# Patient Record
Sex: FEMALE | Race: OTHER | Employment: UNEMPLOYED | URBAN - METROPOLITAN AREA
[De-identification: names, ages, dates, MRNs, and addresses within clinical notes are randomized per-mention and may not be internally consistent; named-entity substitution may affect disease eponyms.]

---

## 2019-04-08 ENCOUNTER — HOSPITAL ENCOUNTER (OUTPATIENT)
Dept: LAB | Age: 29
Discharge: HOME | End: 2019-04-08

## 2019-04-08 PROCEDURE — 87491 CHLMYD TRACH DNA AMP PROBE: CPT

## 2019-04-08 PROCEDURE — 87086 URINE CULTURE/COLONY COUNT: CPT

## 2019-04-10 LAB
BACTERIA SPEC CULT: ABNORMAL
BACTERIA SPEC CULT: ABNORMAL
CC UR VC: ABNORMAL
SERVICE CMNT-IMP: ABNORMAL

## 2021-12-22 ENCOUNTER — TRANSCRIBE ORDER (OUTPATIENT)
Dept: SCHEDULING | Age: 31
End: 2021-12-22

## 2021-12-22 DIAGNOSIS — R16.0 LIVER MASS: Primary | ICD-10-CM

## 2022-01-11 ENCOUNTER — HOSPITAL ENCOUNTER (OUTPATIENT)
Dept: MRI IMAGING | Age: 32
Discharge: HOME | End: 2022-01-11
Attending: PHYSICIAN ASSISTANT
Payer: SUBSIDIZED

## 2022-01-11 DIAGNOSIS — R16.0 LIVER MASS: ICD-10-CM

## 2022-01-11 PROCEDURE — 74181 MRI ABDOMEN W/O CONTRAST: CPT

## 2022-09-08 ENCOUNTER — HOSPITAL ENCOUNTER (OUTPATIENT)
Dept: LAB | Age: 32
Discharge: HOME | End: 2022-09-08

## 2022-09-08 PROCEDURE — 87798 DETECT AGENT NOS DNA AMP: CPT

## 2022-09-08 PROCEDURE — 87086 URINE CULTURE/COLONY COUNT: CPT

## 2022-09-10 LAB
BACTERIA SPEC CULT: NORMAL
SERVICE CMNT-IMP: NORMAL

## 2022-09-13 LAB
A VAGINAE DNA VAG QL NAA+PROBE: ABNORMAL SCORE
BVAB2 DNA VAG QL NAA+PROBE: ABNORMAL SCORE
C ALBICANS DNA VAG QL NAA+PROBE: NEGATIVE
C GLABRATA DNA VAG QL NAA+PROBE: NEGATIVE
MEGA1 DNA VAG QL NAA+PROBE: ABNORMAL SCORE
SPECIMEN SOURCE: ABNORMAL
T VAGINALIS RRNA SPEC QL NAA+PROBE: NEGATIVE

## 2023-08-22 LAB
ABO, EXTERNAL RESULT: NORMAL
C. TRACHOMATIS, EXTERNAL RESULT: NEGATIVE
HEP B, EXTERNAL RESULT: NEGATIVE
HIV, EXTERNAL RESULT: NEGATIVE
N. GONORRHOEAE, EXTERNAL RESULT: NEGATIVE
RH FACTOR, EXTERNAL RESULT: POSITIVE
RPR, EXTERNAL RESULT: NORMAL
RUBELLA TITER, EXTERNAL RESULT: NORMAL

## 2023-12-12 ENCOUNTER — ROUTINE PRENATAL (OUTPATIENT)
Age: 33
End: 2023-12-12

## 2023-12-12 VITALS — DIASTOLIC BLOOD PRESSURE: 73 MMHG | HEIGHT: 64 IN | HEART RATE: 93 BPM | SYSTOLIC BLOOD PRESSURE: 117 MMHG

## 2023-12-12 DIAGNOSIS — E66.01 MORBID (SEVERE) OBESITY DUE TO EXCESS CALORIES (HCC): ICD-10-CM

## 2023-12-12 DIAGNOSIS — Z3A.31 31 WEEKS GESTATION OF PREGNANCY: Primary | ICD-10-CM

## 2023-12-12 PROCEDURE — 76811 OB US DETAILED SNGL FETUS: CPT | Performed by: OBSTETRICS & GYNECOLOGY

## 2023-12-12 PROCEDURE — 99204 OFFICE O/P NEW MOD 45 MIN: CPT | Performed by: OBSTETRICS & GYNECOLOGY

## 2023-12-13 PROBLEM — E66.01 MORBID (SEVERE) OBESITY DUE TO EXCESS CALORIES (HCC): Status: ACTIVE | Noted: 2023-12-13

## 2023-12-13 NOTE — PROCEDURES
PATIENT: Antonio Leyva   -  : 1990   -  DOS:2023   -  INTERPRETING PROVIDER:Namrata Abdi,   Indication  ========    Obesity in pregnancy BMI 43    Method  ======    Transabdominal ultrasound examination. View: Suboptimal view: limited by maternal body habitus, limited by late gestational age    Dating  ======    LMP on: 2023  Cycle: regular cycle  GA by LMP 35 w + 2 d  LAYNE by LMP: 2024  Previous Ultrasound on: 2023  Type of prior assessment: GA  GA at prior assessment date 12 w + 1 d  GA by previous U/S 31 w + 0 d  LAYNE by previous Ultrasound: 2024  Ultrasound examination on: 2023  GA by U/S based upon: AC, BPD, Femur, HC  GA by U/S 28 w + 4 d  LAYNE by U/S: 2024  Assigned: based on ultrasound (GA), selected on 2023  Assigned GA 31 w + 0 d  Assigned LAYNE: 2024    Fetal Growth Overview  =================    Exam date        GA              BPD (mm)          HC (mm)              AC (mm)               FL (mm)             HL (mm)          EFW (g)  2023        31w 0d        82.8     94%        293.7    53%        295.3     97%        58.8    27%        56     89%            86%    Fetal Biometry  ============    Standard  BPD 82.8 mm 33w 2d 94% Hadlock  .0 mm 33w 4d 95% Louisa  .7 mm 32w 3d 53% Hadlock  Cerebellum tr 42.0 mm 33w 1d 93% Hill  .3 mm 33w 4d 97% Hadlock  Femur 58.8 mm 30w 5d 27% Hadlock  Humerus 56.0 mm 32w 4d 89% Louisa  EFW 2,003 g 32w 2d 86% Hadlock  EFW (lb) 4 lb  EFW (oz) 7 oz  EFW by: Hadlock (BPD-HC-AC-FL)  Extended   5.5 mm  CM 5.5 mm  10% Nicolaides  Head / Face / Neck  Nasal bone: NOT VISUALIZED  Other Structures   bpm    General Evaluation  ==============    Cardiac activity present.  bpm. Fetal movements: visualized. Presentation: Cephalic  Placenta: Placental site: anterior, appropriate distance from the internal os  Umbilical cord: Cord vessels: 3 vessel cord.  Insertion site: placental insertion

## 2024-01-09 ENCOUNTER — ROUTINE PRENATAL (OUTPATIENT)
Age: 34
End: 2024-01-09
Payer: MEDICAID

## 2024-01-09 VITALS — HEART RATE: 99 BPM | SYSTOLIC BLOOD PRESSURE: 118 MMHG | DIASTOLIC BLOOD PRESSURE: 75 MMHG

## 2024-01-09 DIAGNOSIS — O99.213 MATERNAL MORBID OBESITY IN THIRD TRIMESTER, ANTEPARTUM (HCC): Primary | ICD-10-CM

## 2024-01-09 DIAGNOSIS — Z3A.35 35 WEEKS GESTATION OF PREGNANCY: ICD-10-CM

## 2024-01-09 DIAGNOSIS — O36.63X0 LGA (LARGE FOR GESTATIONAL AGE) FETUS AFFECTING MANAGEMENT OF MOTHER, THIRD TRIMESTER, NOT APPLICABLE OR UNSPECIFIED FETUS: ICD-10-CM

## 2024-01-09 DIAGNOSIS — E66.01 MATERNAL MORBID OBESITY IN THIRD TRIMESTER, ANTEPARTUM (HCC): Primary | ICD-10-CM

## 2024-01-09 PROCEDURE — 76816 OB US FOLLOW-UP PER FETUS: CPT | Performed by: OBSTETRICS & GYNECOLOGY

## 2024-01-09 PROCEDURE — 76819 FETAL BIOPHYS PROFIL W/O NST: CPT | Performed by: OBSTETRICS & GYNECOLOGY

## 2024-01-09 PROCEDURE — 99212 OFFICE O/P EST SF 10 MIN: CPT | Performed by: OBSTETRICS & GYNECOLOGY

## 2024-01-09 NOTE — PROCEDURES
allowing us to share in this patient?s care. Please do not hesitate to call if you have any questions.    Plan of Care  ==========    CC: Obesity in pregnancy BMI 43    Patient returns today for an ultrasound examination and office visit/consultation. Patient?s interval obstetrical history is reported as benign and uneventful. Patient denies  any obstetrical complaints. Patient reports good fetal movements. Patient denies regular/frequent contractions, vaginal bleeding or leakage of fluid.    Impression:  Intrauterine pregnancy at 35 weeks and 0 days.  Morbid obesity.  Grand multiparity. Patient has delivered Vaginally a baby whose  weight was greater than 9 pounds.    Recommendations:  Return to your office for obstetrical care and delivery.  See my recommendations below.  I recommend the patient be delivered at 39 weeks and 0 to 6 days gestation. An earlier delivery might be necessary depending on the clinical circumstances or the  emergence of other complications.    Patient expressed her understanding of the above. I answered all her questions.  Thank you for allowing us to share in this patient's care. If you have any questions or if I can be of any further assistance to you, please do not hesitate to call.  For this office visit/consultation I spent 15 minutes in the care of this patient.      Parts of this note and ultrasound report were completed using Dragon medical speech recognition software. Grammatical errors, random word insertions, pronoun errors  and incomplete sentences are occasional consequences of voice recognition technology and other factors. If there are any questions or concerns about the content of this  note or information contained in this dictation, please let me know as soon as possible.      Throughout this encounter the patient and FOB were wearing a mask, and I was wearing a mask. I maintained the recommended social distancing.  Due to the increased risk of Covid-19 and other

## 2024-01-23 LAB — GBS, EXTERNAL RESULT: NEGATIVE

## 2024-01-25 ENCOUNTER — ROUTINE PRENATAL (OUTPATIENT)
Age: 34
End: 2024-01-25
Payer: MEDICAID

## 2024-01-25 VITALS — SYSTOLIC BLOOD PRESSURE: 112 MMHG | DIASTOLIC BLOOD PRESSURE: 74 MMHG | HEART RATE: 99 BPM

## 2024-01-25 DIAGNOSIS — Z3A.37 37 WEEKS GESTATION OF PREGNANCY: ICD-10-CM

## 2024-01-25 DIAGNOSIS — O09.43 GRAND MULTIPARITY WITH ANTENATAL PROBLEM IN THIRD TRIMESTER: ICD-10-CM

## 2024-01-25 DIAGNOSIS — O99.213 MATERNAL MORBID OBESITY IN THIRD TRIMESTER, ANTEPARTUM (HCC): Primary | ICD-10-CM

## 2024-01-25 DIAGNOSIS — E66.01 MATERNAL MORBID OBESITY IN THIRD TRIMESTER, ANTEPARTUM (HCC): Primary | ICD-10-CM

## 2024-01-25 DIAGNOSIS — O36.63X0 LGA (LARGE FOR GESTATIONAL AGE) FETUS AFFECTING MANAGEMENT OF MOTHER, THIRD TRIMESTER, NOT APPLICABLE OR UNSPECIFIED FETUS: ICD-10-CM

## 2024-01-25 PROCEDURE — 99212 OFFICE O/P EST SF 10 MIN: CPT | Performed by: OBSTETRICS & GYNECOLOGY

## 2024-01-25 PROCEDURE — 76819 FETAL BIOPHYS PROFIL W/O NST: CPT | Performed by: OBSTETRICS & GYNECOLOGY

## 2024-01-25 NOTE — PROCEDURES
normal    Findings  =======    Viable Islas pregnancy at 37w 2d by clinical dates.  Amniotic fluid is normal.  Placenta is anterior, appropriate distance from the internal os.  Biophysical profile score is 8/8.  Cephalic presentation.    Islas living intrauterine pregnancy at 37 weeks and 2 days.  Limited fetal anatomic survey.  Reassuring ultrasound fetal biophysical profile score. Although these studies are reassuring, they do not guarantee a normal fetal outcome.  The patient expressed understanding of the findings as described above as well as the diagnostic limitations of obstetric ultrasound examinations. I answered all her  questions.    Thank you for allowing us to share in this patient?s care. Please do not hesitate to call if you have any questions.    Plan of Care  ==========    CC: Obesity in pregnancy BMI 43, LGA. Grand multiparity.    This consultation was carried out with the help of a Czech-speaking  #[id number] who translated for the patient.    Patient returns today for an ultrasound examination and office visit/consultation. Patient?s interval obstetrical history is reported as benign and uneventful. Patient denies  any obstetrical complaints. Patient reports good fetal movements. Patient denies regular/frequent contractions, vaginal bleeding or leakage of fluid.    Patient complained of feeling tired. She occasionally has a bifrontal headache that resolves on its own. From time to time, she experiences some mild shortness of breath.  She denies chest pain.    Patient stated that she has been scheduled to be delivered on or around 2024.    Impression:  Intrauterine pregnancy at 35 weeks and 0 days.  Morbid obesity.  Grand multiparity. Patient has delivered Vaginally a baby whose  weight was greater than 9 pounds. She is at increased risks for peripartum complications such as  malpresentation, postpartum hemorrhage, among others.    Recommendations:  Return to

## 2024-02-07 ENCOUNTER — HOSPITAL ENCOUNTER (INPATIENT)
Facility: HOSPITAL | Age: 34
LOS: 1 days | Discharge: HOME | DRG: 560 | End: 2024-02-09
Attending: STUDENT IN AN ORGANIZED HEALTH CARE EDUCATION/TRAINING PROGRAM | Admitting: OBSTETRICS & GYNECOLOGY
Payer: MEDICAID

## 2024-02-07 ENCOUNTER — HOSPITAL ENCOUNTER (OUTPATIENT)
Facility: HOSPITAL | Age: 34
Discharge: HOME | DRG: 560 | End: 2024-02-07
Attending: STUDENT IN AN ORGANIZED HEALTH CARE EDUCATION/TRAINING PROGRAM | Admitting: STUDENT IN AN ORGANIZED HEALTH CARE EDUCATION/TRAINING PROGRAM
Payer: MEDICAID

## 2024-02-07 VITALS
SYSTOLIC BLOOD PRESSURE: 115 MMHG | HEART RATE: 93 BPM | OXYGEN SATURATION: 98 % | TEMPERATURE: 98.3 F | DIASTOLIC BLOOD PRESSURE: 69 MMHG | RESPIRATION RATE: 16 BRPM

## 2024-02-07 PROCEDURE — 7210000100 HC LABOR FEE PER 1 HR: Performed by: OBSTETRICS & GYNECOLOGY

## 2024-02-07 PROCEDURE — G0378 HOSPITAL OBSERVATION PER HR: HCPCS

## 2024-02-07 PROCEDURE — 99212 OFFICE O/P EST SF 10 MIN: CPT

## 2024-02-07 PROCEDURE — 59025 FETAL NON-STRESS TEST: CPT

## 2024-02-07 PROCEDURE — G0379 DIRECT REFER HOSPITAL OBSERV: HCPCS

## 2024-02-07 NOTE — PROGRESS NOTES
1305: Patient arrived to L&D room 209 with reports of contractions every 10 min. Pt denies any leaking of fluid. Pt reports good fetal movement. Pt oriented to room and unit, plan of care discussed with pt, pt verbalized understanding.     1315: SVE per this RN 4/50/-3.    1320: Dr. Echeverria given an update on pt, MD stated to have a 20 min NST and let pt walk for an hour and recheck.    1445: Dr. Echeverria at bedside, SVE per MD 4/50/-3. MD stated pt can be discharged home. MD reviewing discharge instructions with pt, pt verbalized understanding.     1455: Patient given written and verbal discharge instructions, pt verbalized understanding and discharged home in stable condition.

## 2024-02-07 NOTE — H&P
History & Physical    Name: Kirsten Mercado MRN: [id number]  SSN: [id number]    YOB: 1990  Age: 33 y.o.  Sex: female        Subjective:     Estimated Date of Delivery: 24    Ms. Mercado is a  at 39w1d presenting to L&D for pelvic pain. Reports pressure in her low abdomen. Reports contractions started this morning. Currently feeling them every 15-20 mins. Pressure is present between contractions. Denies LOF, VB. +GFM. SVE in office 2 days ago was 4/50/-3     Pregnancy complicated by:  Suspected macrosomia at 33wks, ?resolved: EFW 63rd %tile at 37wks 3158gm  BMI 44- ldASA  Late transfer of care at 26wks      OB History          6    Para   5    Term   5       0    AB   0    Living   5         SAB   0    IAB   0    Ectopic   0    Molar   0    Multiple   0    Live Births   5              No past medical history on file.  No past surgical history on file.  Social History     Occupational History    Not on file   Tobacco Use    Smoking status: Never    Smokeless tobacco: Never   Vaping Use    Vaping Use: Never used   Substance and Sexual Activity    Alcohol use: Not Currently    Drug use: Never    Sexual activity: Not on file     Family History   Problem Relation Age of Onset    No Known Problems Father     No Known Problems Mother        No Known Allergies  Prior to Admission medications    Medication Sig Start Date End Date Taking? Authorizing Provider   Prenatal Vit w/Kz-Iutqpgiio-JG (PNV PO) Take by mouth    Provider, MD Twin        Review of Systems: Pertinent items are noted in HPI.    Objective:     Vitals:  Vitals:    24 1335 24 1340 24 1345 24 1350   BP:       Pulse:       Resp:       Temp:       TempSrc:       SpO2: 99% 99% 98% 98%        Physical Exam:  Gen: NAD    Membranes:  Intact  Fetal Heart Rate: Reactive  Baseline: 130 per minute  Variability: moderate  Accelerations: yes  Decelerations: none  Uterine contractions: none noted on TOCO    SVE:  4/50/-3, unchanged compared to prior exam    Prenatal Labs:   No components found for: \"OBEXTABORH\", \"OBEXTABSCRN\", \"OBEXTRUBELLA\", \"OBEXTGRBS\", \"OBEXTHBSAG\", \"OBEXTHIV\", \"OBEXTRPR\", \"OBEXTGONORR\", \"OBEXTCHLAM\"     Assessment/Plan:     Plan: 33 y.o.  at 39w1d, presenting for labor rule out  - Rh pos / RI/GBS NEG   - Reactive and reassuring NST  - SVE unchanged over 2 hrs. Pt reports contraction every 15-20 mins. No contractions noted on TOCO. We discussed she may be in early or false labor but there are no current signs of active labor. Reviewed STRICT labor precautions, advised to return to hospital immediately if contractions increase in frequency or intensity  -Scheduled for IOL on     Signed By:  Rosey Echeverria MD     2024

## 2024-02-08 ENCOUNTER — ANESTHESIA EVENT (OUTPATIENT)
Facility: HOSPITAL | Age: 34
End: 2024-02-08
Payer: MEDICAID

## 2024-02-08 ENCOUNTER — ANESTHESIA (OUTPATIENT)
Facility: HOSPITAL | Age: 34
End: 2024-02-08
Payer: MEDICAID

## 2024-02-08 PROBLEM — E66.01 MORBID OBESITY (HCC): Status: ACTIVE | Noted: 2024-02-08

## 2024-02-08 PROBLEM — Z3A.39 39 WEEKS GESTATION OF PREGNANCY: Status: ACTIVE | Noted: 2024-02-08

## 2024-02-08 PROBLEM — E66.01 MORBID (SEVERE) OBESITY DUE TO EXCESS CALORIES (HCC): Status: RESOLVED | Noted: 2023-12-13 | Resolved: 2024-02-08

## 2024-02-08 LAB
ABO + RH BLD: NORMAL
BASOPHILS # BLD: 0.1 K/UL (ref 0–0.1)
BASOPHILS NFR BLD: 1 % (ref 0–1)
BLOOD GROUP ANTIBODIES SERPL: NORMAL
DIFFERENTIAL METHOD BLD: ABNORMAL
EOSINOPHIL # BLD: 0.1 K/UL (ref 0–0.4)
EOSINOPHIL NFR BLD: 1 % (ref 0–7)
ERYTHROCYTE [DISTWIDTH] IN BLOOD BY AUTOMATED COUNT: 14.9 % (ref 11.5–14.5)
HCT VFR BLD AUTO: 32 % (ref 35–47)
HGB BLD-MCNC: 10.7 G/DL (ref 11.5–16)
IMM GRANULOCYTES # BLD AUTO: 0.1 K/UL (ref 0–0.04)
IMM GRANULOCYTES NFR BLD AUTO: 1 % (ref 0–0.5)
LYMPHOCYTES # BLD: 2 K/UL (ref 0.8–3.5)
LYMPHOCYTES NFR BLD: 27 % (ref 12–49)
MCH RBC QN AUTO: 27.2 PG (ref 26–34)
MCHC RBC AUTO-ENTMCNC: 33.4 G/DL (ref 30–36.5)
MCV RBC AUTO: 81.4 FL (ref 80–99)
MONOCYTES # BLD: 0.5 K/UL (ref 0–1)
MONOCYTES NFR BLD: 7 % (ref 5–13)
NEUTS SEG # BLD: 4.7 K/UL (ref 1.8–8)
NEUTS SEG NFR BLD: 63 % (ref 32–75)
NRBC # BLD: 0 K/UL (ref 0–0.01)
NRBC BLD-RTO: 0 PER 100 WBC
PLATELET # BLD AUTO: 375 K/UL (ref 150–400)
PMV BLD AUTO: 10.3 FL (ref 8.9–12.9)
RBC # BLD AUTO: 3.93 M/UL (ref 3.8–5.2)
SPECIMEN EXP DATE BLD: NORMAL
WBC # BLD AUTO: 7.4 K/UL (ref 3.6–11)

## 2024-02-08 PROCEDURE — 7220000101 HC DELIVERY VAGINAL/SINGLE: Performed by: OBSTETRICS & GYNECOLOGY

## 2024-02-08 PROCEDURE — 2500000003 HC RX 250 WO HCPCS: Performed by: NURSE ANESTHETIST, CERTIFIED REGISTERED

## 2024-02-08 PROCEDURE — 1120000000 HC RM PRIVATE OB

## 2024-02-08 PROCEDURE — 3700000156 HC EPIDURAL ANESTHESIA: Performed by: NURSE ANESTHETIST, CERTIFIED REGISTERED

## 2024-02-08 PROCEDURE — 6370000000 HC RX 637 (ALT 250 FOR IP): Performed by: OBSTETRICS & GYNECOLOGY

## 2024-02-08 PROCEDURE — 6360000002 HC RX W HCPCS: Performed by: NURSE ANESTHETIST, CERTIFIED REGISTERED

## 2024-02-08 PROCEDURE — 3700000025 EPIDURAL BLOCK: Performed by: ANESTHESIOLOGY

## 2024-02-08 PROCEDURE — 86900 BLOOD TYPING SEROLOGIC ABO: CPT

## 2024-02-08 PROCEDURE — 00HU33Z INSERTION OF INFUSION DEVICE INTO SPINAL CANAL, PERCUTANEOUS APPROACH: ICD-10-PCS | Performed by: ANESTHESIOLOGY

## 2024-02-08 PROCEDURE — 86850 RBC ANTIBODY SCREEN: CPT

## 2024-02-08 PROCEDURE — 51701 INSERT BLADDER CATHETER: CPT

## 2024-02-08 PROCEDURE — 86901 BLOOD TYPING SEROLOGIC RH(D): CPT

## 2024-02-08 PROCEDURE — 2580000003 HC RX 258: Performed by: OBSTETRICS & GYNECOLOGY

## 2024-02-08 PROCEDURE — 7210000100 HC LABOR FEE PER 1 HR: Performed by: OBSTETRICS & GYNECOLOGY

## 2024-02-08 PROCEDURE — 36415 COLL VENOUS BLD VENIPUNCTURE: CPT

## 2024-02-08 PROCEDURE — 85025 COMPLETE CBC W/AUTO DIFF WBC: CPT

## 2024-02-08 PROCEDURE — 6360000002 HC RX W HCPCS: Performed by: OBSTETRICS & GYNECOLOGY

## 2024-02-08 PROCEDURE — 94761 N-INVAS EAR/PLS OXIMETRY MLT: CPT

## 2024-02-08 RX ORDER — SODIUM CHLORIDE, SODIUM LACTATE, POTASSIUM CHLORIDE, AND CALCIUM CHLORIDE .6; .31; .03; .02 G/100ML; G/100ML; G/100ML; G/100ML
1000 INJECTION, SOLUTION INTRAVENOUS PRN
Status: DISCONTINUED | OUTPATIENT
Start: 2024-02-08 | End: 2024-02-08

## 2024-02-08 RX ORDER — ONDANSETRON 2 MG/ML
4 INJECTION INTRAMUSCULAR; INTRAVENOUS EVERY 6 HOURS PRN
Status: DISCONTINUED | OUTPATIENT
Start: 2024-02-08 | End: 2024-02-08

## 2024-02-08 RX ORDER — NALOXONE HYDROCHLORIDE 0.4 MG/ML
INJECTION, SOLUTION INTRAMUSCULAR; INTRAVENOUS; SUBCUTANEOUS PRN
Status: DISCONTINUED | OUTPATIENT
Start: 2024-02-08 | End: 2024-02-08

## 2024-02-08 RX ORDER — SODIUM CHLORIDE, SODIUM LACTATE, POTASSIUM CHLORIDE, AND CALCIUM CHLORIDE .6; .31; .03; .02 G/100ML; G/100ML; G/100ML; G/100ML
500 INJECTION, SOLUTION INTRAVENOUS PRN
Status: DISCONTINUED | OUTPATIENT
Start: 2024-02-08 | End: 2024-02-08

## 2024-02-08 RX ORDER — SODIUM CHLORIDE 0.9 % (FLUSH) 0.9 %
5-40 SYRINGE (ML) INJECTION PRN
Status: DISCONTINUED | OUTPATIENT
Start: 2024-02-08 | End: 2024-02-08

## 2024-02-08 RX ORDER — MISOPROSTOL 200 UG/1
400 TABLET ORAL PRN
Status: DISCONTINUED | OUTPATIENT
Start: 2024-02-08 | End: 2024-02-08

## 2024-02-08 RX ORDER — SODIUM CHLORIDE, SODIUM LACTATE, POTASSIUM CHLORIDE, CALCIUM CHLORIDE 600; 310; 30; 20 MG/100ML; MG/100ML; MG/100ML; MG/100ML
INJECTION, SOLUTION INTRAVENOUS CONTINUOUS
Status: DISCONTINUED | OUTPATIENT
Start: 2024-02-08 | End: 2024-02-09 | Stop reason: HOSPADM

## 2024-02-08 RX ORDER — CARBOPROST TROMETHAMINE 250 UG/ML
250 INJECTION, SOLUTION INTRAMUSCULAR PRN
Status: DISCONTINUED | OUTPATIENT
Start: 2024-02-08 | End: 2024-02-08

## 2024-02-08 RX ORDER — SODIUM CHLORIDE 0.9 % (FLUSH) 0.9 %
5-40 SYRINGE (ML) INJECTION EVERY 12 HOURS SCHEDULED
Status: DISCONTINUED | OUTPATIENT
Start: 2024-02-08 | End: 2024-02-09 | Stop reason: HOSPADM

## 2024-02-08 RX ORDER — ACETAMINOPHEN 325 MG/1
650 TABLET ORAL EVERY 4 HOURS PRN
Status: DISCONTINUED | OUTPATIENT
Start: 2024-02-08 | End: 2024-02-08

## 2024-02-08 RX ORDER — METHYLERGONOVINE MALEATE 0.2 MG/ML
200 INJECTION INTRAVENOUS PRN
Status: DISCONTINUED | OUTPATIENT
Start: 2024-02-08 | End: 2024-02-08

## 2024-02-08 RX ORDER — TRANEXAMIC ACID 10 MG/ML
1000 INJECTION, SOLUTION INTRAVENOUS
Status: ACTIVE | OUTPATIENT
Start: 2024-02-08 | End: 2024-02-09

## 2024-02-08 RX ORDER — DIPHENHYDRAMINE HYDROCHLORIDE 50 MG/ML
25 INJECTION INTRAMUSCULAR; INTRAVENOUS EVERY 4 HOURS PRN
Status: DISCONTINUED | OUTPATIENT
Start: 2024-02-08 | End: 2024-02-08

## 2024-02-08 RX ORDER — TRANEXAMIC ACID 10 MG/ML
1000 INJECTION, SOLUTION INTRAVENOUS
Status: DISCONTINUED | OUTPATIENT
Start: 2024-02-08 | End: 2024-02-08

## 2024-02-08 RX ORDER — BUPIVACAINE HYDROCHLORIDE 2.5 MG/ML
INJECTION, SOLUTION EPIDURAL; INFILTRATION; INTRACAUDAL PRN
Status: DISCONTINUED | OUTPATIENT
Start: 2024-02-08 | End: 2024-02-08 | Stop reason: SDUPTHER

## 2024-02-08 RX ORDER — DOCUSATE SODIUM 100 MG/1
100 CAPSULE, LIQUID FILLED ORAL 2 TIMES DAILY
Status: DISCONTINUED | OUTPATIENT
Start: 2024-02-08 | End: 2024-02-09 | Stop reason: HOSPADM

## 2024-02-08 RX ORDER — ONDANSETRON 2 MG/ML
4 INJECTION INTRAMUSCULAR; INTRAVENOUS EVERY 6 HOURS PRN
Status: DISCONTINUED | OUTPATIENT
Start: 2024-02-08 | End: 2024-02-09 | Stop reason: HOSPADM

## 2024-02-08 RX ORDER — OXYCODONE HYDROCHLORIDE 5 MG/1
10 TABLET ORAL EVERY 4 HOURS PRN
Status: DISCONTINUED | OUTPATIENT
Start: 2024-02-08 | End: 2024-02-09 | Stop reason: HOSPADM

## 2024-02-08 RX ORDER — SODIUM CHLORIDE 9 MG/ML
INJECTION, SOLUTION INTRAVENOUS PRN
Status: DISCONTINUED | OUTPATIENT
Start: 2024-02-08 | End: 2024-02-09 | Stop reason: HOSPADM

## 2024-02-08 RX ORDER — SODIUM CHLORIDE 0.9 % (FLUSH) 0.9 %
5-40 SYRINGE (ML) INJECTION PRN
Status: DISCONTINUED | OUTPATIENT
Start: 2024-02-08 | End: 2024-02-09 | Stop reason: HOSPADM

## 2024-02-08 RX ORDER — ACETAMINOPHEN 500 MG
1000 TABLET ORAL EVERY 8 HOURS SCHEDULED
Status: DISCONTINUED | OUTPATIENT
Start: 2024-02-08 | End: 2024-02-08

## 2024-02-08 RX ORDER — SODIUM CHLORIDE 0.9 % (FLUSH) 0.9 %
5-40 SYRINGE (ML) INJECTION EVERY 12 HOURS SCHEDULED
Status: DISCONTINUED | OUTPATIENT
Start: 2024-02-08 | End: 2024-02-08

## 2024-02-08 RX ORDER — SODIUM CHLORIDE 9 MG/ML
INJECTION, SOLUTION INTRAVENOUS PRN
Status: DISCONTINUED | OUTPATIENT
Start: 2024-02-08 | End: 2024-02-08

## 2024-02-08 RX ORDER — CARBOPROST TROMETHAMINE 250 UG/ML
250 INJECTION, SOLUTION INTRAMUSCULAR
Status: ACTIVE | OUTPATIENT
Start: 2024-02-08 | End: 2024-02-09

## 2024-02-08 RX ORDER — METHYLERGONOVINE MALEATE 0.2 MG/ML
200 INJECTION INTRAVENOUS PRN
Status: DISCONTINUED | OUTPATIENT
Start: 2024-02-08 | End: 2024-02-09 | Stop reason: HOSPADM

## 2024-02-08 RX ORDER — IBUPROFEN 800 MG/1
800 TABLET ORAL EVERY 8 HOURS SCHEDULED
Status: DISCONTINUED | OUTPATIENT
Start: 2024-02-08 | End: 2024-02-09 | Stop reason: HOSPADM

## 2024-02-08 RX ORDER — FENTANYL 0.2 MG/100ML-BUPIV 0.125%-NACL 0.9% EPIDURAL INJ 2/0.125 MCG/ML-%
10 SOLUTION INJECTION CONTINUOUS
Status: DISCONTINUED | OUTPATIENT
Start: 2024-02-08 | End: 2024-02-08

## 2024-02-08 RX ORDER — DOCUSATE SODIUM 100 MG/1
100 CAPSULE, LIQUID FILLED ORAL 2 TIMES DAILY
Status: DISCONTINUED | OUTPATIENT
Start: 2024-02-08 | End: 2024-02-08

## 2024-02-08 RX ORDER — OXYCODONE HYDROCHLORIDE 5 MG/1
5 TABLET ORAL EVERY 4 HOURS PRN
Status: DISCONTINUED | OUTPATIENT
Start: 2024-02-08 | End: 2024-02-09 | Stop reason: HOSPADM

## 2024-02-08 RX ORDER — LIDOCAINE HYDROCHLORIDE AND EPINEPHRINE BITARTRATE 20; .01 MG/ML; MG/ML
INJECTION, SOLUTION SUBCUTANEOUS PRN
Status: DISCONTINUED | OUTPATIENT
Start: 2024-02-08 | End: 2024-02-08 | Stop reason: SDUPTHER

## 2024-02-08 RX ORDER — SODIUM CHLORIDE, SODIUM LACTATE, POTASSIUM CHLORIDE, CALCIUM CHLORIDE 600; 310; 30; 20 MG/100ML; MG/100ML; MG/100ML; MG/100ML
INJECTION, SOLUTION INTRAVENOUS CONTINUOUS
Status: DISCONTINUED | OUTPATIENT
Start: 2024-02-08 | End: 2024-02-08

## 2024-02-08 RX ORDER — ACETAMINOPHEN 500 MG
1000 TABLET ORAL EVERY 8 HOURS SCHEDULED
Status: DISCONTINUED | OUTPATIENT
Start: 2024-02-08 | End: 2024-02-09 | Stop reason: HOSPADM

## 2024-02-08 RX ORDER — LANOLIN/MINERAL OIL
LOTION (ML) TOPICAL PRN
Status: DISCONTINUED | OUTPATIENT
Start: 2024-02-08 | End: 2024-02-09 | Stop reason: HOSPADM

## 2024-02-08 RX ADMIN — BUPIVACAINE HYDROCHLORIDE 70 ML: 2.5 INJECTION, SOLUTION EPIDURAL; INFILTRATION; INTRACAUDAL; PERINEURAL at 02:25

## 2024-02-08 RX ADMIN — ACETAMINOPHEN 1000 MG: 500 TABLET ORAL at 04:28

## 2024-02-08 RX ADMIN — LIDOCAINE HYDROCHLORIDE,EPINEPHRINE BITARTRATE 3 ML: 20; .01 INJECTION, SOLUTION INFILTRATION; PERINEURAL at 01:34

## 2024-02-08 RX ADMIN — Medication 10 ML/HR: at 02:13

## 2024-02-08 RX ADMIN — OXYTOCIN 999 MILLI-UNITS/MIN: 10 INJECTION, SOLUTION INTRAMUSCULAR; INTRAVENOUS at 03:21

## 2024-02-08 RX ADMIN — ACETAMINOPHEN 1000 MG: 500 TABLET ORAL at 12:54

## 2024-02-08 RX ADMIN — METHYLERGONOVINE MALEATE 200 MCG: 0.2 INJECTION, SOLUTION INTRAMUSCULAR; INTRAVENOUS at 03:21

## 2024-02-08 RX ADMIN — BUPIVACAINE HYDROCHLORIDE 10 ML: 2.5 INJECTION, SOLUTION EPIDURAL; INFILTRATION; INTRACAUDAL; PERINEURAL at 01:40

## 2024-02-08 ASSESSMENT — PAIN DESCRIPTION - DESCRIPTORS: DESCRIPTORS: CRAMPING

## 2024-02-08 ASSESSMENT — PAIN DESCRIPTION - LOCATION: LOCATION: ABDOMEN

## 2024-02-08 ASSESSMENT — PAIN SCALES - GENERAL
PAINLEVEL_OUTOF10: 5
PAINLEVEL_OUTOF10: 0

## 2024-02-08 NOTE — ANESTHESIA PRE PROCEDURE
Department of Anesthesiology  Preprocedure Note       Name:  Kirsten Mercado   Age:  33 y.o.  :  1990                                          MRN:  [id number]         Date:  2024      Surgeon: * No surgeons listed *    Procedure: * No procedures listed *    Medications prior to admission:   Prior to Admission medications    Medication Sig Start Date End Date Taking? Authorizing Provider   Prenatal Vit w/Xc-Kecakrdnb-BO (PNV PO) Take by mouth    Provider, MD Twin       Current medications:    Current Facility-Administered Medications   Medication Dose Route Frequency Provider Last Rate Last Admin   • lactated ringers IV soln infusion   IntraVENous Continuous Philip Holguin MD       • lactated ringers bolus bolus 500 mL  500 mL IntraVENous PRN Philip Holguin MD        Or   • lactated ringers bolus bolus 1,000 mL  1,000 mL IntraVENous PRN Philip Holguin MD       • sodium chloride flush 0.9 % injection 5-40 mL  5-40 mL IntraVENous 2 times per day Philip Holguin MD       • sodium chloride flush 0.9 % injection 5-40 mL  5-40 mL IntraVENous PRN Philip Holguin MD       • 0.9 % sodium chloride infusion   IntraVENous PRN Philip Holguin MD       • ondansetron (ZOFRAN) injection 4 mg  4 mg IntraVENous Q6H PRN Philip Holguin MD       • oxytocin (PITOCIN) 30 units in 500 mL infusion  87.3 fannie-units/min IntraVENous Continuous PRN Philip Holguin MD        And   • oxytocin (PITOCIN) 10 unit bolus from the bag  10 Units IntraVENous PRN Philip Holguin MD       • methylergonovine (METHERGINE) injection 200 mcg  200 mcg IntraMUSCular PRN Phliip Holguin MD       • carboprost (HEMABATE) injection 250 mcg  250 mcg IntraMUSCular PRN Philip Holguin MD       • miSOPROStol (CYTOTEC) tablet 400 mcg  400 mcg SubLINGual PRN Philip Holguin MD       • tranexamic acid-NaCl IVPB premix 1,000 mg  1,000 mg IntraVENous Once PRN Philip Holguin MD       • acetaminophen

## 2024-02-08 NOTE — LACTATION NOTE
This note was copied from a baby's chart.  Mother talking on phone with family.  Mothers 6th baby to BF.  Mother denies questions or needs.  Booklet given in [place]an.  Mother speak english well, no need for interpretor for LC visit.    Discussed with mother her plan for feeding.  Reviewed the benefits of exclusive breast milk feeding during the hospital stay.  She acknowledges understanding of information reviewed and states that it is her plan to breastfeed and formula feed her infant.  Will support her choice and offer additional information as needed.     Reviewed breastfeeding basics:  How milk is made and normal  breastfeeding behaviors discussed.  Supply and demand,  stomach size, early feeding cues, skin to skin bonding with comfortable positioning and baby led latch-on reviewed.   Breastfeeding Booklet and Warm line information provided with discussion.  Discussed typical  weight loss and the importance of pediatrician appointment within 24-48 hours of discharge, at 2 weeks of life and normalcy of requesting pediatric weight checks as needed in between visits.       Pt will successfully establish breastfeeding by feeding in response to early feeding cues or wake every 3h, will obtain deep latch, and will keep log of feedings/output.  Taught to BF at hunger cues and or q 2-3 hrs and to offer 10-20 drops of hand expressed colostrum at any non-feeds.            Maternal Response: Relaxed and confident      Formula Type: Similac 360 Total Care     Latch:  (did not see at breast at this time)

## 2024-02-08 NOTE — DISCHARGE SUMMARY
Obstetrical Discharge Summary     Name: Kirsten Mercado MRN: [id number]  SSN: [id number]    YOB: 1990  Age: 33 y.o.  Sex: female      Admit Date: 2024    Discharge Date: 24     Attending Physician:  Marlena June MD     Delivering Physician:  Philip Holguin MD     * Admission Diagnoses:   IUP @ 39w2d        * Discharge Diagnoses:   Delivery of a viable infant via spontaneous vaginal delivery by Philip Holguin MD on 2024.  Apgars were 9 and 9.    Same as above         Additional Diagnoses:      No results found for: \"RUBELLAEXT\", \"GRBSEXT\"   There is no immunization history for the selected administration types on file for this patient.    * Procedures:            * Discharge Condition: good    * Hospital Course: Normal hospital course following the delivery.    * Disposition: Home    Discharge Medications:      Medication List        ASK your doctor about these medications      PNV PO              * Follow-up Care/Patient Instructions:  Activity: Activity as tolerated  Diet: Regular Diet  Wound Care: As directed  Followup 2 weeks for PP check        Signed By:  Marlena June MD  [facility]     2024

## 2024-02-08 NOTE — H&P
History & Physical    Name: Kirsten Mercado MRN: [id number]  SSN: [id number]    YOB: 1990  Age: 33 y.o.  Sex: female        Subjective:   Chief Complaint: Contractions  Estimated Date of Delivery: 24  OB History    Para Term  AB Living   6 5 5 0 0 5   SAB IAB Ectopic Molar Multiple Live Births   0 0 0 0 0 5      # Outcome Date GA Lbr Kush/2nd Weight Sex Delivery Anes PTL Lv   6 Current            5 Term 20 37w0d  4.082 kg (9 lb) F Vag-Spont EPI  RUEL   4 Term 17 35w0d  3.629 kg (8 lb) F Vag-Spont EPI  RUEL   3 Term 14 36w0d  4.082 kg (9 lb) M Vag-Spont EPI  RUEL   2 Term 10/10/13 37w0d  4.082 kg (9 lb) F Vag-Spont EPI  RUEL   1 Term 08 37w0d  3.175 kg (7 lb) M Vag-Spont   RUEL       Kirsten Mercado, 33 y.o.,  ,  presents at 39w2d, complaining of Contractions.  She reports a gush of fluid at 2230, an then started having contractions.  The fluid is clear.   Prenatal course was normal. Please see prenatal records for details.    No Known Allergies    Prior to Admission medications    Medication Sig Start Date End Date Taking? Authorizing Provider   Prenatal Vit w/Th-Eahhcrjws-XW (PNV PO) Take by mouth    Provider, MD Twin       Past Medical History:   Diagnosis Date    Morbid (severe) obesity due to excess calories (HCC) 2023       History reviewed. No pertinent surgical history.    Social History     Occupational History    Not on file   Tobacco Use    Smoking status: Never    Smokeless tobacco: Never   Vaping Use    Vaping Use: Never used   Substance and Sexual Activity    Alcohol use: Not Currently    Drug use: Never    Sexual activity: Not on file       Family History   Problem Relation Age of Onset    No Known Problems Father     No Known Problems Mother        Review of Systems   All other systems reviewed and are negative.          Objective:     Physical Exam:    Patient Vitals for the past 24 hrs:   BP Pulse   24 0017 117/66 98

## 2024-02-08 NOTE — L&D DELIVERY NOTE
Delivery Procedure Note    2024    Delivery Physician:  Philip Holguin MD    Procedure: Spontaneous vaginal delivery    Anesthesia: Epidural    Monitor:  Fetal Heart Tones-External and Uterine Contractions- External    Estimated Blood Loss: 200    Episiotomy: Episiotomy: None    Laceration(s):  None    Laceration(s) repair: No    Suture used for repair: None    Fetal Description: leslie    Fetal Position: Left Occiput Anterior     Interventions: Nasopharyngeal/Oropharyngeal Suctioning, Warming/Drying, and Monitoring vital signs    Birth:   Baby Weight:   Information for the patient's :  Karen Mercado [[id number]]   Birth Weight: N/A     Baby Apgar 1 min:   Information for the patient's :  Karen Mercado [[id number]]   APGAR One: 9     Baby Apgar 5 min:   Information for the patient's :  Karen Mercado [[id number]]   APGAR Five: 9     Baby :   Information for the patient's :  Karen Mercado [[id number]]   2024     Baby Type of Delivery:   Information for the patient's :  Karen Mercado [[id number]]   Delivery Method: Vaginal, Spontaneous     Baby Gender:   Information for the patient's :  Karen Mercado [[id number]]   male       Umbilical Cord: Nuchal Cord x  1 and 3 vessels present    Placenta Removal: expressed    Placenta Appearance: normal intact    Specimens: None           Complications:none      Signed By:  Philip Holguin MD     2024          present

## 2024-02-08 NOTE — PROGRESS NOTES
2340: Patient ambulatory onto unit with FOB. Patient reports SROM that occurred at 2200, clear/moderate fluid. Patient endorses +FM, denies vaginal bleeding.    2350: Nitrazine positive.    0017: Ezio GRANT at bedside.    0017: Fluids initiated, patient requesting epidural.    0118: DANNY Bernal at bedside.    0123: Time Out performed.    0134: Test Dose administered.    0140: Bolus administered.    0319:  of viable male infant by Ezio GRANT.    0515: Patient declines need to void at this time. Raissa-care performed, and pads changed.    0606: TRANSFER - OUT REPORT:    Verbal & Bedside report given to REUBEN Martini on Kirsten Mercado  being transferred to MIU for routine progression of patient care       Report consisted of patient's Situation, Background, Assessment and   Recommendations(SBAR).     Information from the following report(s) Nurse Handoff Report, Intake/Output, MAR, Recent Results, and Neuro Assessment was reviewed with the receiving nurse.           Lines:   Peripheral IV 24 Left Antecubital (Active)   Site Assessment Clean, dry & intact 24 0024   Line Status Infusing;Flushed 24 0024   Line Care Connections checked and tightened;Chlorhexidine wipes 24 002   Phlebitis Assessment No symptoms 24 002   Infiltration Assessment 0 24 0024   Alcohol Cap Used Yes 24 002   Dressing Status New dressing applied;Clean, dry & intact 24 002   Dressing Type Transparent 24        Opportunity for questions and clarification was provided.      Patient transported with:  Registered Nurse

## 2024-02-08 NOTE — PROGRESS NOTES
Post-Partum Vaginal Delivery Note    Kirsten Mercado  [id number]  1990  33 y.o.    S:  Ms. Kirsten Mercado is a 33 y.o.  PPD #0 s/p  @ 39w2d.  Doing well.  She had a baby boy.  Her lochia is like a period.  She describes her pain as mild and is well controlled with PO medications.  She is breast feeding and supplementing with formula feeds.  She is ambulating and voiding.  Tolerating PO intake.      O:   BP (!) 102/57   Pulse 78   Temp 98.4 °F (36.9 °C)   Resp 18   Ht 1.626 m (5' 4\")   Wt 121.1 kg (267 lb)   LMP 2023   SpO2 98%   Breastfeeding Unknown   BMI 45.83 kg/m²     Gen - No acute distress  Respirations - nonlabored   Abdomen - Fundus firm below the umbilicus   Ext - Warm, well perfused, nontender     Lab Results   Component Value Date/Time    WBC 7.4 2024 12:21 AM    HGB 10.7 2024 12:21 AM    HCT 32.0 2024 12:21 AM     2024 12:21 AM    MCV 81.4 2024 12:21 AM         A/P:  33 y.o.  PPD #0 s/p  @ 39w2d doing well.    1.  Routine PP instructions/ care discussed  2.  Blood type - Rh pos  3.  Rubella immune   4.  Circumcision declines   5.  Discharge PPD1-2   6.  F/U 4-6 weeks for PP check.      Marlena June MD  [facility]

## 2024-02-08 NOTE — ANESTHESIA PROCEDURE NOTES
Epidural Block    Patient location during procedure: OB  Start time: 2/8/2024 1:20 AM  End time: 2/8/2024 1:42 AM  Reason for block: labor epidural  Staffing  Resident/CRNA: Seth Bernal APRN - CRNA  Performed by: Seth Bernal APRN - CRNA  Authorized by: Lobo Chin MD    Epidural  Patient position: sitting  Prep: ChloraPrep  Patient monitoring: continuous pulse ox and frequent blood pressure checks  Approach: midline  Location: L4-5  Injection technique: CIRA saline  Provider prep: mask, sterile gloves and sterile gown  Needle  Needle type: Tuohy   Needle gauge: 17 G  Needle length: 3.5 in  Needle insertion depth: 8 cm  Catheter type: end hole  Catheter size: 20 G  Catheter at skin depth: 14 cm  Test dose: negativeCatheter Secured: tegaderm and tape  Assessment  Sensory level: T6  Hemodynamics: stable  Attempts: 1  Outcomes: uncomplicated and patient tolerated procedure well  Preanesthetic Checklist  Completed: patient identified, IV checked, site marked, risks and benefits discussed, surgical/procedural consents, equipment checked, pre-op evaluation, timeout performed, anesthesia consent given, oxygen available, monitors applied/VS acknowledged, fire risk safety assessment completed and verbalized and blood product R/B/A discussed and consented

## 2024-02-09 VITALS
DIASTOLIC BLOOD PRESSURE: 66 MMHG | BODY MASS INDEX: 45.58 KG/M2 | SYSTOLIC BLOOD PRESSURE: 98 MMHG | RESPIRATION RATE: 14 BRPM | OXYGEN SATURATION: 99 % | HEIGHT: 64 IN | HEART RATE: 61 BPM | TEMPERATURE: 97.5 F | WEIGHT: 267 LBS

## 2024-02-09 PROBLEM — Z3A.39 39 WEEKS GESTATION OF PREGNANCY: Status: RESOLVED | Noted: 2024-02-08 | Resolved: 2024-02-09

## 2024-02-09 PROCEDURE — 6370000000 HC RX 637 (ALT 250 FOR IP): Performed by: OBSTETRICS & GYNECOLOGY

## 2024-02-09 PROCEDURE — 94761 N-INVAS EAR/PLS OXIMETRY MLT: CPT

## 2024-02-09 RX ADMIN — IBUPROFEN 800 MG: 800 TABLET, FILM COATED ORAL at 02:33

## 2024-02-09 RX ADMIN — OXYCODONE HYDROCHLORIDE 5 MG: 5 TABLET ORAL at 02:33

## 2024-02-09 RX ADMIN — DOCUSATE SODIUM 100 MG: 100 CAPSULE, LIQUID FILLED ORAL at 08:28

## 2024-02-09 ASSESSMENT — PAIN DESCRIPTION - ORIENTATION: ORIENTATION: ANTERIOR;LOWER

## 2024-02-09 ASSESSMENT — PAIN SCALES - GENERAL: PAINLEVEL_OUTOF10: 6

## 2024-02-09 ASSESSMENT — PAIN DESCRIPTION - LOCATION: LOCATION: ABDOMEN

## 2024-02-09 ASSESSMENT — PAIN DESCRIPTION - DESCRIPTORS: DESCRIPTORS: CRAMPING

## 2024-02-09 NOTE — ANESTHESIA POSTPROCEDURE EVALUATION
Department of Anesthesiology  Postprocedure Note    Patient: Kirsten Mercado  MRN: [id number]  YOB: 1990  Date of evaluation: 2/8/2024    Procedure Summary     Date: 02/08/24 Room / Location:     Anesthesia Start: 0112 Anesthesia Stop: 0319    Procedure: Labor Analgesia Diagnosis:     Scheduled Providers:  Responsible Provider: Lobo Chin MD    Anesthesia Type: epidural, regional ASA Status: 2          Anesthesia Type: No value filed.    Luis Fernando Phase I:      Luis Fernando Phase II:      Anesthesia Post Evaluation    Comments: No known complications        No notable events documented.

## 2024-02-09 NOTE — PROGRESS NOTES
Post-Partum Vaginal Delivery Note    Kirsten Mercado  [id number]  1990  33 y.o.    S:  Ms. Kirsten Mercado is a 33 y.o.  PPD #1 s/p  @ 39w2d.  Doing well.  She had a baby boy.  Her lochia is like a period.  She describes her pain as mild and is well controlled with PO medications.  She is breast feeding well.  She is ambulating and voiding.  Tolerating PO intake.      O:   BP 98/66   Pulse 61   Temp 97.5 °F (36.4 °C) (Oral)   Resp 14   Ht 1.626 m (5' 4\")   Wt 121.1 kg (267 lb)   LMP 2023   SpO2 99%   Breastfeeding Unknown   BMI 45.83 kg/m²     Gen - No acute distress  Respirations - nonlabored   Abdomen - Fundus firm below the umbilicus   Ext - Warm, well perfused, nontender     Lab Results   Component Value Date/Time    WBC 7.4 2024 12:21 AM    HGB 10.7 2024 12:21 AM    HCT 32.0 2024 12:21 AM     2024 12:21 AM    MCV 81.4 2024 12:21 AM         A/P:  33 y.o.  PPD #1 s/p  @ 39w2d doing well.    1.  Routine PP instructions/ care discussed  2.  Blood type - Rh pos  3.  Rubella immune   4.  Circumcision declines   5.  Discharge PPD1   6.  F/U 2 weeks for PP check.      Marlena June MD  [facility]

## 2024-02-09 NOTE — PROGRESS NOTES
Patient discharged to home, education complete. Patient stated understanding with all questions answered.  Prescriptions: None

## 2024-02-09 NOTE — DISCHARGE INSTRUCTIONS
Después del parto (período de posparto): Instrucciones de cuidado  After Your Delivery (the Postpartum Period): Care Instructions  Instrucciones de cuidado     Felicidades por el nacimiento de rivera bebé. Al igual que el embarazo, el tiempo con el recién nacido puede ser un momento de entusiasmo, alegría y agotamiento. Es posible que se sienta garcia al mirar la claribel de rivera pequeño bebé. También podría sentirse abrumada por rivera nuevo ritmo de sueño y las nuevas responsabilidades.  Al principio, los bebés suelen dormir jimmy el día y permanecen despiertos jimmy la noche. No tienen ningún patrón ni rutina. Podrían ish gritos ahogados, sacudirse y despertarse, o parecer godfrey si tuvieran los ojos cruzados (bizcos). Todo esto es normal, e incluso la pueden hacer sonreír.  Jimmy las primeras semanas siguientes al parto, trate de cuidarse mariana. Podría tardar de 4 a 6 semanas en volver a sentirse usted misma, y posiblemente más tiempo si le burton hecho oma cesárea. Es probable que se sienta muy fatigada jimmy varias semanas. Elaine días estarán llenos de altibajos, sapphire también de mucha alegría.  La atención de seguimiento es oma parte clave de rivera tratamiento y seguridad. Asegúrese de hacer y acudir a todas las citas, y llame a rivera médico si está teniendo problemas. También es oma buena idea saber los resultados de elaine exámenes y mantener oma lista de los medicamentos que martha.  ¿Cómo puede cuidarse en el hogar?  Cuide rivera cuerpo después del parto  Utilice toallas sanitarias en vez de tampones para las pérdidas de hi que podrían durar hasta 2 semanas.  Alivie los cólicos con ibuprofeno (Advil, Motrin).  Alivie el dolor de las hemorroides y la taylor entre la vagina y el recto con compresas de hielo o de infusión de hamamelis virginiana (\"witch hazel\").  Alivie el estreñimiento bebiendo mucho líquido y comiendo alimentos ricos en fibra. Pregúntele a rivera médico acerca de los ablandadores de heces de venta